# Patient Record
Sex: MALE | Race: WHITE | NOT HISPANIC OR LATINO | Employment: FULL TIME | URBAN - METROPOLITAN AREA
[De-identification: names, ages, dates, MRNs, and addresses within clinical notes are randomized per-mention and may not be internally consistent; named-entity substitution may affect disease eponyms.]

---

## 2019-04-16 ENCOUNTER — OFFICE VISIT (OUTPATIENT)
Dept: URGENT CARE | Facility: CLINIC | Age: 44
End: 2019-04-16
Payer: COMMERCIAL

## 2019-04-16 VITALS
DIASTOLIC BLOOD PRESSURE: 63 MMHG | HEART RATE: 73 BPM | OXYGEN SATURATION: 97 % | SYSTOLIC BLOOD PRESSURE: 134 MMHG | WEIGHT: 193.6 LBS | BODY MASS INDEX: 27.1 KG/M2 | RESPIRATION RATE: 20 BRPM | HEIGHT: 71 IN | TEMPERATURE: 97.4 F

## 2019-04-16 DIAGNOSIS — J01.10 ACUTE FRONTAL SINUSITIS, RECURRENCE NOT SPECIFIED: Primary | ICD-10-CM

## 2019-04-16 PROCEDURE — G0382 LEV 3 HOSP TYPE B ED VISIT: HCPCS | Performed by: FAMILY MEDICINE

## 2019-04-16 RX ORDER — MOMETASONE FUROATE 50 UG/1
1 SPRAY, METERED NASAL 2 TIMES DAILY
Qty: 17 G | Refills: 0 | Status: SHIPPED | OUTPATIENT
Start: 2019-04-16 | End: 2019-05-30

## 2019-04-16 RX ORDER — AZITHROMYCIN 250 MG/1
TABLET, FILM COATED ORAL
Qty: 6 TABLET | Refills: 0 | Status: SHIPPED | OUTPATIENT
Start: 2019-04-16 | End: 2019-04-21

## 2019-05-30 ENCOUNTER — APPOINTMENT (EMERGENCY)
Dept: CT IMAGING | Facility: HOSPITAL | Age: 44
End: 2019-05-30
Payer: COMMERCIAL

## 2019-05-30 ENCOUNTER — HOSPITAL ENCOUNTER (EMERGENCY)
Facility: HOSPITAL | Age: 44
Discharge: HOME | End: 2019-05-30
Attending: EMERGENCY MEDICINE | Admitting: EMERGENCY MEDICINE
Payer: COMMERCIAL

## 2019-05-30 VITALS
HEART RATE: 77 BPM | RESPIRATION RATE: 16 BRPM | DIASTOLIC BLOOD PRESSURE: 64 MMHG | WEIGHT: 194 LBS | TEMPERATURE: 97.3 F | OXYGEN SATURATION: 99 % | BODY MASS INDEX: 27.06 KG/M2 | SYSTOLIC BLOOD PRESSURE: 135 MMHG

## 2019-05-30 DIAGNOSIS — R10.9 ABDOMINAL PAIN: Primary | ICD-10-CM

## 2019-05-30 LAB
ALBUMIN SERPL BCP-MCNC: 3.8 G/DL (ref 3.5–5)
ALP SERPL-CCNC: 61 U/L (ref 46–116)
ALT SERPL W P-5'-P-CCNC: 40 U/L (ref 12–78)
ANION GAP SERPL CALCULATED.3IONS-SCNC: 8 MMOL/L (ref 4–13)
AST SERPL W P-5'-P-CCNC: 29 U/L (ref 5–45)
BASOPHILS # BLD AUTO: 0.03 THOUSANDS/ΜL (ref 0–0.1)
BASOPHILS NFR BLD AUTO: 0 % (ref 0–1)
BILIRUB SERPL-MCNC: 0.38 MG/DL (ref 0.2–1)
BILIRUB UR QL STRIP: NEGATIVE
BUN SERPL-MCNC: 13 MG/DL (ref 5–25)
CALCIUM SERPL-MCNC: 9.7 MG/DL (ref 8.3–10.1)
CHLORIDE SERPL-SCNC: 108 MMOL/L (ref 100–108)
CLARITY UR: ABNORMAL
CO2 SERPL-SCNC: 28 MMOL/L (ref 21–32)
COLOR UR: YELLOW
CREAT SERPL-MCNC: 1.32 MG/DL (ref 0.6–1.3)
EOSINOPHIL # BLD AUTO: 0.12 THOUSAND/ΜL (ref 0–0.61)
EOSINOPHIL NFR BLD AUTO: 2 % (ref 0–6)
ERYTHROCYTE [DISTWIDTH] IN BLOOD BY AUTOMATED COUNT: 12.2 % (ref 11.6–15.1)
GFR SERPL CREATININE-BSD FRML MDRD: 64 ML/MIN/1.73SQ M
GLUCOSE SERPL-MCNC: 79 MG/DL (ref 65–140)
GLUCOSE UR STRIP-MCNC: NEGATIVE MG/DL
HCT VFR BLD AUTO: 44.4 % (ref 36.5–49.3)
HGB BLD-MCNC: 14.6 G/DL (ref 12–17)
HGB UR QL STRIP.AUTO: NEGATIVE
IMM GRANULOCYTES # BLD AUTO: 0.03 THOUSAND/UL (ref 0–0.2)
IMM GRANULOCYTES NFR BLD AUTO: 0 % (ref 0–2)
KETONES UR STRIP-MCNC: NEGATIVE MG/DL
LEUKOCYTE ESTERASE UR QL STRIP: NEGATIVE
LIPASE SERPL-CCNC: 154 U/L (ref 73–393)
LYMPHOCYTES # BLD AUTO: 2.14 THOUSANDS/ΜL (ref 0.6–4.47)
LYMPHOCYTES NFR BLD AUTO: 26 % (ref 14–44)
MCH RBC QN AUTO: 30.7 PG (ref 26.8–34.3)
MCHC RBC AUTO-ENTMCNC: 32.9 G/DL (ref 31.4–37.4)
MCV RBC AUTO: 94 FL (ref 82–98)
MONOCYTES # BLD AUTO: 0.59 THOUSAND/ΜL (ref 0.17–1.22)
MONOCYTES NFR BLD AUTO: 7 % (ref 4–12)
NEUTROPHILS # BLD AUTO: 5.3 THOUSANDS/ΜL (ref 1.85–7.62)
NEUTS SEG NFR BLD AUTO: 65 % (ref 43–75)
NITRITE UR QL STRIP: NEGATIVE
NRBC BLD AUTO-RTO: 0 /100 WBCS
PH UR STRIP.AUTO: 8.5 [PH] (ref 4.5–8)
PLATELET # BLD AUTO: 306 THOUSANDS/UL (ref 149–390)
PMV BLD AUTO: 9.5 FL (ref 8.9–12.7)
POTASSIUM SERPL-SCNC: 3.7 MMOL/L (ref 3.5–5.3)
PROT SERPL-MCNC: 7.3 G/DL (ref 6.4–8.2)
PROT UR STRIP-MCNC: NEGATIVE MG/DL
RBC # BLD AUTO: 4.75 MILLION/UL (ref 3.88–5.62)
SODIUM SERPL-SCNC: 144 MMOL/L (ref 136–145)
SP GR UR STRIP.AUTO: 1.01 (ref 1–1.03)
UROBILINOGEN UR QL STRIP.AUTO: 0.2 E.U./DL
WBC # BLD AUTO: 8.21 THOUSAND/UL (ref 4.31–10.16)

## 2019-05-30 PROCEDURE — 83690 ASSAY OF LIPASE: CPT | Performed by: EMERGENCY MEDICINE

## 2019-05-30 PROCEDURE — 99284 EMERGENCY DEPT VISIT MOD MDM: CPT

## 2019-05-30 PROCEDURE — 81003 URINALYSIS AUTO W/O SCOPE: CPT

## 2019-05-30 PROCEDURE — 85025 COMPLETE CBC W/AUTO DIFF WBC: CPT | Performed by: EMERGENCY MEDICINE

## 2019-05-30 PROCEDURE — 99284 EMERGENCY DEPT VISIT MOD MDM: CPT | Performed by: EMERGENCY MEDICINE

## 2019-05-30 PROCEDURE — 36415 COLL VENOUS BLD VENIPUNCTURE: CPT | Performed by: EMERGENCY MEDICINE

## 2019-05-30 PROCEDURE — 80053 COMPREHEN METABOLIC PANEL: CPT | Performed by: EMERGENCY MEDICINE

## 2019-05-30 PROCEDURE — 74177 CT ABD & PELVIS W/CONTRAST: CPT

## 2019-05-30 PROCEDURE — 96374 THER/PROPH/DIAG INJ IV PUSH: CPT

## 2019-05-30 PROCEDURE — 96361 HYDRATE IV INFUSION ADD-ON: CPT

## 2019-05-30 PROCEDURE — 96375 TX/PRO/DX INJ NEW DRUG ADDON: CPT

## 2019-05-30 RX ORDER — METOCLOPRAMIDE 10 MG/1
10 TABLET ORAL EVERY 6 HOURS
Qty: 10 TABLET | Refills: 0 | Status: SHIPPED | OUTPATIENT
Start: 2019-05-30

## 2019-05-30 RX ORDER — ONDANSETRON 2 MG/ML
4 INJECTION INTRAMUSCULAR; INTRAVENOUS ONCE
Status: COMPLETED | OUTPATIENT
Start: 2019-05-30 | End: 2019-05-30

## 2019-05-30 RX ORDER — DICYCLOMINE HCL 20 MG
20 TABLET ORAL 2 TIMES DAILY
Qty: 10 TABLET | Refills: 0 | Status: SHIPPED | OUTPATIENT
Start: 2019-05-30

## 2019-05-30 RX ORDER — KETOROLAC TROMETHAMINE 30 MG/ML
15 INJECTION, SOLUTION INTRAMUSCULAR; INTRAVENOUS ONCE
Status: COMPLETED | OUTPATIENT
Start: 2019-05-30 | End: 2019-05-30

## 2019-05-30 RX ADMIN — SODIUM CHLORIDE 1000 ML: 0.9 INJECTION, SOLUTION INTRAVENOUS at 15:29

## 2019-05-30 RX ADMIN — IOHEXOL 100 ML: 350 INJECTION, SOLUTION INTRAVENOUS at 16:07

## 2019-05-30 RX ADMIN — ONDANSETRON 4 MG: 2 INJECTION INTRAMUSCULAR; INTRAVENOUS at 15:29

## 2019-05-30 RX ADMIN — KETOROLAC TROMETHAMINE 15 MG: 30 INJECTION, SOLUTION INTRAMUSCULAR; INTRAVENOUS at 15:24

## 2023-12-08 ENCOUNTER — OFFICE VISIT (OUTPATIENT)
Dept: FAMILY MEDICINE CLINIC | Facility: CLINIC | Age: 48
End: 2023-12-08
Payer: COMMERCIAL

## 2023-12-08 ENCOUNTER — TELEPHONE (OUTPATIENT)
Dept: ADMINISTRATIVE | Facility: OTHER | Age: 48
End: 2023-12-08

## 2023-12-08 VITALS
SYSTOLIC BLOOD PRESSURE: 128 MMHG | RESPIRATION RATE: 18 BRPM | BODY MASS INDEX: 28.63 KG/M2 | OXYGEN SATURATION: 98 % | HEART RATE: 74 BPM | TEMPERATURE: 98.1 F | HEIGHT: 70 IN | DIASTOLIC BLOOD PRESSURE: 86 MMHG | WEIGHT: 200 LBS

## 2023-12-08 DIAGNOSIS — E78.2 MIXED HYPERLIPIDEMIA: ICD-10-CM

## 2023-12-08 DIAGNOSIS — R14.0 BLOATING: ICD-10-CM

## 2023-12-08 DIAGNOSIS — M25.551 CHRONIC ARTHRALGIAS OF KNEES AND HIPS: ICD-10-CM

## 2023-12-08 DIAGNOSIS — R11.0 NAUSEA: ICD-10-CM

## 2023-12-08 DIAGNOSIS — R10.9 ABDOMINAL PAIN, UNSPECIFIED ABDOMINAL LOCATION: Primary | ICD-10-CM

## 2023-12-08 DIAGNOSIS — K21.9 GASTROESOPHAGEAL REFLUX DISEASE, UNSPECIFIED WHETHER ESOPHAGITIS PRESENT: ICD-10-CM

## 2023-12-08 DIAGNOSIS — G89.29 CHRONIC ARTHRALGIAS OF KNEES AND HIPS: ICD-10-CM

## 2023-12-08 DIAGNOSIS — M25.561 CHRONIC ARTHRALGIAS OF KNEES AND HIPS: ICD-10-CM

## 2023-12-08 DIAGNOSIS — M25.552 CHRONIC ARTHRALGIAS OF KNEES AND HIPS: ICD-10-CM

## 2023-12-08 DIAGNOSIS — M25.562 CHRONIC ARTHRALGIAS OF KNEES AND HIPS: ICD-10-CM

## 2023-12-08 DIAGNOSIS — R53.83 OTHER FATIGUE: ICD-10-CM

## 2023-12-08 PROCEDURE — 99203 OFFICE O/P NEW LOW 30 MIN: CPT

## 2023-12-08 RX ORDER — OMEPRAZOLE 20 MG/1
20 CAPSULE, DELAYED RELEASE ORAL DAILY
COMMUNITY

## 2023-12-08 NOTE — TELEPHONE ENCOUNTER
----- Message from Mahi Cifuentes sent at 12/8/2023  9:41 AM EST -----  Regarding: Care Gap update  12/08/23 9:42 AM    Hello, our patient King Brooks has had CRC: Colonoscopy completed/performed. Please assist in updating the patient chart by pulling the Care Everywhere (CE) document with [facility]. The date of service is 04/13/2023.      Thank you,  Ivy Grayson [facility]

## 2023-12-08 NOTE — PROGRESS NOTES
Name: Brenden Duque      : 1975      MRN: [id number]  Encounter Provider: JIM Monteiro  Encounter Date: 2023   Encounter department: [address] [place] [address]. Abdominal pain, unspecified abdominal location  Assessment & Plan:  Patient reports generalized abdominal discomfort, indigestion, nausea, and bloating for approx 1 year. Seen by GI-- per patient EGD and colonoscopy was unremarkable. Patient has tried to taper off of omeprazole but gets severe rebound indigestion. Has been taking EOD. Has tried pepcid in the past--states that although it may be unrelated, he started with an eye problem at the same time as starting pepcid so he discontinued. Declines ref to [facility] GI. Ordered CBC, CMP, thyroid panel, celiac panel, CRP, and lipase. Last CT in 2019. Repeat if symptoms persist? Return for follow up 2-3 weeks    Orders:  -     Celiac Disease Comprehensive Panel; Future  -     Celiac Disease Comprehensive Panel  -     Lipase; Future  -     Lipase    2. Other fatigue  Assessment & Plan:  Fatigue mostly associated with abd discomfort. Checking CRP and thyroid panel. Consider test in the future. Orders:  -     C-reactive protein; Future  -     CBC and differential; Future  -     Comprehensive metabolic panel; Future  -     T4, free; Future  -     TSH, 3rd generation; Future  -     C-reactive protein  -     CBC and differential  -     Comprehensive metabolic panel  -     T4, free  -     TSH, 3rd generation    3. Mixed hyperlipidemia  -     Lipid Panel with Direct LDL reflex; Future  -     Lipid Panel with Direct LDL reflex    4. Chronic arthralgias of knees and hips  -     C-reactive protein; Future  -     C-reactive protein    5. Bloating  Assessment & Plan:  See abd pain note. Orders:  -     Celiac Disease Comprehensive Panel; Future  -     Celiac Disease Comprehensive Panel  -     Lipase; Future  -     Lipase    6.  Nausea  Assessment & Plan:  Discontinued reglan. Encouraged food diary. Orders:  -     Celiac Disease Comprehensive Panel; Future  -     Celiac Disease Comprehensive Panel  -     Lipase; Future  -     Lipase    7. Gastroesophageal reflux disease, unspecified whether esophagitis present  Assessment & Plan:  Takes omeprazole EOD. BMI Counseling: Body mass index is 29.11 kg/m². The BMI is above normal. Nutrition recommendations include decreasing portion sizes. Exercise recommendations include exercising 3-5 times per week. Rationale for BMI follow-up plan is due to patient being overweight or obese. Depression Screening and Follow-up Plan: Patient was screened for depression during today's encounter. They screened negative with a PHQ-2 score of 1. Subjective      Heartburn  He complains of abdominal pain, early satiety, heartburn and nausea. He reports no belching, no chest pain, no choking, no coughing, no dysphagia, no globus sensation, no hoarse voice, no sore throat, no stridor, no tooth decay, no water brash or no wheezing. This is a chronic problem. The current episode started more than 1 year ago. The problem occurs frequently. The problem has been unchanged. The heartburn is located in the LUQ and substernum. The heartburn is of moderate intensity. Exacerbated by: unknown. Pertinent negatives include no anemia, fatigue, melena, muscle weakness, orthopnea or weight loss. Risk factors include caffeine use and hiatal hernia (decreases caffeine to 1 cup coffee/daily). He has tried head elevation, a PPI, a histamine-2 antagonist, medication cessation and a diet change for the symptoms. The treatment provided mild relief. Past procedures include an EGD. Review of Systems   Constitutional:  Negative for activity change, fatigue and weight loss. HENT:  Negative for congestion, ear pain, hoarse voice, rhinorrhea and sore throat. Eyes:  Negative for pain.    Respiratory:  Negative for cough, choking, shortness of breath and wheezing. Cardiovascular:  Negative for chest pain and leg swelling. Gastrointestinal:  Positive for abdominal distention, abdominal pain, heartburn and nausea. Negative for anal bleeding, blood in stool, constipation, diarrhea, dysphagia, melena, rectal pain and vomiting. Musculoskeletal:  Negative for arthralgias, myalgias and muscle weakness. Skin:  Negative for rash. Neurological:  Negative for dizziness, weakness and numbness. All other systems reviewed and are negative. Current Outpatient Medications on File Prior to Visit   Medication Sig    Cetirizine HCl (ZYRTEC ALLERGY PO) Take by mouth    omeprazole (PriLOSEC) 20 mg delayed release capsule Take 20 mg by mouth daily    [DISCONTINUED] dicyclomine (BENTYL) 20 mg tablet Take 1 tablet (20 mg total) by mouth 2 (two) times a day (Patient not taking: Reported on 12/8/2023)    [DISCONTINUED] metoclopramide (REGLAN) 10 mg tablet Take 1 tablet (10 mg total) by mouth every 6 (six) hours (Patient not taking: Reported on 12/8/2023)       Objective     /86 (BP Location: Right arm, Patient Position: Sitting, Cuff Size: Adult)   Pulse 74   Temp 98.1 °F (36.7 °C) (Tympanic)   Resp 18   Ht 5' 9.5" (1.765 m)   Wt 90.7 kg (200 lb)   SpO2 98%   BMI 29.11 kg/m²     Physical Exam  Vitals and nursing note reviewed. Constitutional:       Appearance: Normal appearance. HENT:      Head: Normocephalic. Cardiovascular:      Rate and Rhythm: Normal rate and regular rhythm. Heart sounds: Normal heart sounds. Pulmonary:      Effort: Pulmonary effort is normal.      Breath sounds: Normal breath sounds. Abdominal:      General: Abdomen is flat. Bowel sounds are normal. There is no distension. Palpations: Abdomen is soft. Tenderness: There is no abdominal tenderness. There is no guarding or rebound. Musculoskeletal:      Cervical back: Neck supple. Skin:     General: Skin is warm and dry.    Neurological: General: No focal deficit present. Mental Status: He is alert and oriented to person, place, and time. Mental status is at baseline.    Psychiatric:         Behavior: Behavior normal.       Arie Kawasaki, CRNP

## 2023-12-08 NOTE — ASSESSMENT & PLAN NOTE
Patient reports generalized abdominal discomfort, indigestion, nausea, and bloating for approx 1 year. Seen by GI-- per patient EGD and colonoscopy was unremarkable. Patient has tried to taper off of omeprazole but gets severe rebound indigestion. Has been taking EOD. Has tried pepcid in the past--states that although it may be unrelated, he started with an eye problem at the same time as starting pepcid so he discontinued. Declines ref to [facility] GI. Ordered CBC, CMP, thyroid panel, celiac panel, CRP, and lipase. Last CT in 2019.  Repeat if symptoms persist? Return for follow up 2-3 weeks

## 2023-12-08 NOTE — TELEPHONE ENCOUNTER
Upon review of the In Basket request we were able to locate, review, and update the patient chart as requested for CRC: Colonoscopy. Any additional questions or concerns should be emailed to the Practice Liaisons via the appropriate education email address, please do not reply via In Basket.     Thank you  Fatuma Nassar MA

## 2023-12-08 NOTE — ASSESSMENT & PLAN NOTE
Fatigue mostly associated with abd discomfort. Checking CRP and thyroid panel. Consider test in the future.

## 2023-12-23 LAB
ALBUMIN SERPL-MCNC: 4.2 G/DL (ref 3.6–5.1)
ALBUMIN/GLOB SERPL: 2.1 (CALC) (ref 1–2.5)
ALP SERPL-CCNC: 73 U/L (ref 35–144)
ALT SERPL-CCNC: 28 U/L (ref 9–46)
AST SERPL-CCNC: 22 U/L (ref 10–35)
BASOPHILS # BLD AUTO: 42 CELLS/UL (ref 0–200)
BASOPHILS NFR BLD AUTO: 0.7 %
BILIRUB SERPL-MCNC: 0.4 MG/DL (ref 0.2–1.2)
BUN SERPL-MCNC: 22 MG/DL (ref 7–25)
BUN/CREAT SERPL: NORMAL (CALC) (ref 6–22)
CALCIUM SERPL-MCNC: 9.3 MG/DL (ref 8.6–10.3)
CHLORIDE SERPL-SCNC: 107 MMOL/L (ref 98–110)
CHOLEST SERPL-MCNC: 226 MG/DL
CHOLEST/HDLC SERPL: 5.1 (CALC)
CO2 SERPL-SCNC: 28 MMOL/L (ref 20–32)
CREAT SERPL-MCNC: 1.11 MG/DL (ref 0.7–1.3)
CRP SERPL-MCNC: 0.3 MG/L
EOSINOPHIL # BLD AUTO: 102 CELLS/UL (ref 15–500)
EOSINOPHIL NFR BLD AUTO: 1.7 %
ERYTHROCYTE [DISTWIDTH] IN BLOOD BY AUTOMATED COUNT: 11.8 % (ref 11–15)
GFR/BSA.PRED SERPLBLD CYS-BASED-ARV: 81 ML/MIN/1.73M2
GLOBULIN SER CALC-MCNC: 2 G/DL (CALC) (ref 1.9–3.7)
GLUCOSE SERPL-MCNC: 98 MG/DL (ref 65–99)
HCT VFR BLD AUTO: 43.7 % (ref 38.5–50)
HDLC SERPL-MCNC: 44 MG/DL
HGB BLD-MCNC: 14.9 G/DL (ref 13.2–17.1)
IGA SERPL-MCNC: 118 MG/DL (ref 47–310)
LDLC SERPL CALC-MCNC: 157 MG/DL (CALC)
LIPASE SERPL-CCNC: 32 U/L (ref 7–60)
LYMPHOCYTES # BLD AUTO: 2076 CELLS/UL (ref 850–3900)
LYMPHOCYTES NFR BLD AUTO: 34.6 %
MCH RBC QN AUTO: 31.1 PG (ref 27–33)
MCHC RBC AUTO-ENTMCNC: 34.1 G/DL (ref 32–36)
MCV RBC AUTO: 91.2 FL (ref 80–100)
MONOCYTES # BLD AUTO: 426 CELLS/UL (ref 200–950)
MONOCYTES NFR BLD AUTO: 7.1 %
NEUTROPHILS # BLD AUTO: 3354 CELLS/UL (ref 1500–7800)
NEUTROPHILS NFR BLD AUTO: 55.9 %
NONHDLC SERPL-MCNC: 182 MG/DL (CALC)
PLATELET # BLD AUTO: 316 THOUSAND/UL (ref 140–400)
PMV BLD REES-ECKER: 10.2 FL (ref 7.5–12.5)
POTASSIUM SERPL-SCNC: 4.2 MMOL/L (ref 3.5–5.3)
PROT SERPL-MCNC: 6.2 G/DL (ref 6.1–8.1)
RBC # BLD AUTO: 4.79 MILLION/UL (ref 4.2–5.8)
SODIUM SERPL-SCNC: 140 MMOL/L (ref 135–146)
T4 FREE SERPL-MCNC: 1 NG/DL (ref 0.8–1.8)
TRIGL SERPL-MCNC: 130 MG/DL
TSH SERPL-ACNC: 0.85 MIU/L (ref 0.4–4.5)
TTG IGA SER-ACNC: <1 U/ML
WBC # BLD AUTO: 6 THOUSAND/UL (ref 3.8–10.8)

## 2023-12-26 ENCOUNTER — TELEPHONE (OUTPATIENT)
Dept: FAMILY MEDICINE CLINIC | Facility: CLINIC | Age: 48
End: 2023-12-26

## 2023-12-26 NOTE — TELEPHONE ENCOUNTER
Pt aware       ----- Message from JIM Kaye sent at 12/24/2023  3:14 PM EST -----  Please notify patient:  Labs overall look good other than elevated cholesterol and LDL. Manage with healthy diet (fruits, veggies, lean protein, whole grains, legumes) and continue with exercise regimen. I believe he told me he exercises regularly.   ----- Message -----  From: Albert Cheung Lab Results In  Sent: 12/23/2023  12:45 AM EST  To: JIM Kaye

## 2024-01-03 ENCOUNTER — HOSPITAL ENCOUNTER (EMERGENCY)
Facility: HOSPITAL | Age: 49
Discharge: HOME | End: 2024-01-03
Attending: EMERGENCY MEDICINE
Payer: COMMERCIAL

## 2024-01-03 VITALS
SYSTOLIC BLOOD PRESSURE: 145 MMHG | BODY MASS INDEX: 29.88 KG/M2 | TEMPERATURE: 97.9 F | RESPIRATION RATE: 19 BRPM | HEART RATE: 86 BPM | OXYGEN SATURATION: 95 % | WEIGHT: 205.25 LBS | DIASTOLIC BLOOD PRESSURE: 78 MMHG

## 2024-01-03 DIAGNOSIS — H53.8 BLURRED VISION, RIGHT EYE: ICD-10-CM

## 2024-01-03 DIAGNOSIS — R51.9 HEADACHE: Primary | ICD-10-CM

## 2024-01-03 PROCEDURE — 99283 EMERGENCY DEPT VISIT LOW MDM: CPT

## 2024-01-03 PROCEDURE — 99283 EMERGENCY DEPT VISIT LOW MDM: CPT | Performed by: EMERGENCY MEDICINE

## 2024-01-04 ENCOUNTER — OFFICE VISIT (OUTPATIENT)
Dept: FAMILY MEDICINE CLINIC | Facility: CLINIC | Age: 49
End: 2024-01-04
Payer: COMMERCIAL

## 2024-01-04 VITALS
SYSTOLIC BLOOD PRESSURE: 138 MMHG | TEMPERATURE: 97.5 F | OXYGEN SATURATION: 98 % | DIASTOLIC BLOOD PRESSURE: 90 MMHG | WEIGHT: 200 LBS | BODY MASS INDEX: 29.62 KG/M2 | HEART RATE: 80 BPM | HEIGHT: 69 IN | RESPIRATION RATE: 16 BRPM

## 2024-01-04 DIAGNOSIS — E78.49 OTHER HYPERLIPIDEMIA: ICD-10-CM

## 2024-01-04 DIAGNOSIS — Z00.00 ROUTINE GENERAL MEDICAL EXAMINATION AT A HEALTH CARE FACILITY: ICD-10-CM

## 2024-01-04 DIAGNOSIS — R10.84 GENERALIZED ABDOMINAL PAIN: ICD-10-CM

## 2024-01-04 DIAGNOSIS — K21.9 GASTROESOPHAGEAL REFLUX DISEASE WITHOUT ESOPHAGITIS: ICD-10-CM

## 2024-01-04 DIAGNOSIS — G44.89 OTHER HEADACHE SYNDROME: ICD-10-CM

## 2024-01-04 DIAGNOSIS — R14.0 BLOATING: ICD-10-CM

## 2024-01-04 DIAGNOSIS — G44.219 EPISODIC TENSION-TYPE HEADACHE, NOT INTRACTABLE: Primary | ICD-10-CM

## 2024-01-04 PROBLEM — R53.83 OTHER FATIGUE: Status: RESOLVED | Noted: 2023-12-08 | Resolved: 2024-01-04

## 2024-01-04 PROCEDURE — 99204 OFFICE O/P NEW MOD 45 MIN: CPT | Performed by: FAMILY MEDICINE

## 2024-01-04 PROCEDURE — 99386 PREV VISIT NEW AGE 40-64: CPT | Performed by: FAMILY MEDICINE

## 2024-01-04 RX ORDER — BUTALBITAL, ACETAMINOPHEN AND CAFFEINE 300; 40; 50 MG/1; MG/1; MG/1
1 CAPSULE ORAL DAILY PRN
Qty: 30 CAPSULE | Refills: 1 | Status: SHIPPED | OUTPATIENT
Start: 2024-01-04

## 2024-01-04 NOTE — ED PROVIDER NOTES
History  Chief Complaint   Patient presents with    Headache     Pt reports blurred vision to right eye and headache for the last 9 months. States that the headache is not new but has been worsening over time, states new onset of dizziness for the last few days . Pt states his PCP just retired and came to be evaluated in ED instead. Denies numbness/tingling/ chest pain and sob.        History provided by:  Patient   used: No    Headache  Pain location:  Occipital  Radiates to: front of head.  Severity currently:  Unable to specify  Severity at highest:  Unable to specify  Onset quality:  Unable to specify  Timing:  Unable to specify  Progression:  Waxing and waning  Chronicity:  Recurrent  Similar to prior headaches: yes    Context: bright light    Relieved by:  Nothing  Worsened by:  Nothing  Ineffective treatments:  None tried  Associated symptoms: blurred vision and photophobia    Associated symptoms: no abdominal pain, no back pain, no cough, no diarrhea, no dizziness, no eye pain, no fever, no nausea, no neck pain, no neck stiffness, no sore throat and no vomiting    Risk factors comment:  None      Prior to Admission Medications   Prescriptions Last Dose Informant Patient Reported? Taking?   Cetirizine HCl (ZYRTEC ALLERGY PO)   Yes No   Sig: Take by mouth   omeprazole (PriLOSEC) 20 mg delayed release capsule   Yes No   Sig: Take 20 mg by mouth daily      Facility-Administered Medications: None       History reviewed. No pertinent past medical history.    History reviewed. No pertinent surgical history.    History reviewed. No pertinent family history.  I have reviewed and agree with the history as documented.    E-Cigarette/Vaping    E-Cigarette Use Never User      E-Cigarette/Vaping Substances     Social History     Tobacco Use    Smoking status: Former     Types: Cigarettes    Smokeless tobacco: Never   Vaping Use    Vaping status: Never Used   Substance Use Topics    Alcohol use: Yes     Drug use: Not Currently       Review of Systems   Constitutional:  Negative for chills and fever.   HENT:  Negative for facial swelling, sore throat and trouble swallowing.    Eyes:  Positive for blurred vision and photophobia. Negative for pain and visual disturbance.   Respiratory:  Negative for cough and shortness of breath.    Cardiovascular:  Negative for chest pain and leg swelling.   Gastrointestinal:  Negative for abdominal pain, diarrhea, nausea and vomiting.   Genitourinary:  Negative for dysuria and flank pain.   Musculoskeletal:  Negative for back pain, neck pain and neck stiffness.   Skin:  Negative for pallor and rash.   Allergic/Immunologic: Negative for environmental allergies and immunocompromised state.   Neurological:  Positive for headaches. Negative for dizziness.   Hematological:  Negative for adenopathy. Does not bruise/bleed easily.   Psychiatric/Behavioral:  Negative for agitation and behavioral problems.    All other systems reviewed and are negative.      Physical Exam  Physical Exam  Vitals and nursing note reviewed.   Constitutional:       General: He is not in acute distress.     Appearance: He is well-developed.   HENT:      Head: Normocephalic and atraumatic.   Eyes:      Extraocular Movements: Extraocular movements intact.      Pupils: Pupils are equal, round, and reactive to light.   Cardiovascular:      Rate and Rhythm: Normal rate and regular rhythm.   Pulmonary:      Effort: Pulmonary effort is normal. No respiratory distress.      Breath sounds: Normal breath sounds.   Abdominal:      Palpations: Abdomen is soft.      Tenderness: There is no abdominal tenderness. There is no guarding or rebound.   Musculoskeletal:         General: Normal range of motion.      Cervical back: Normal range of motion and neck supple.   Skin:     General: Skin is warm and dry.   Neurological:      General: No focal deficit present.      Mental Status: He is alert and oriented to person, place, and  time.      Cranial Nerves: No cranial nerve deficit.      Motor: No weakness.      Coordination: Coordination normal.   Psychiatric:         Mood and Affect: Mood normal.         Behavior: Behavior normal.         Vital Signs  ED Triage Vitals   Temperature Pulse Respirations Blood Pressure SpO2   01/03/24 1931 01/03/24 1934 01/03/24 1934 01/03/24 1934 01/03/24 1934   97.9 °F (36.6 °C) 86 19 145/78 95 %      Temp Source Heart Rate Source Patient Position - Orthostatic VS BP Location FiO2 (%)   01/03/24 1931 01/03/24 1934 01/03/24 1934 01/03/24 1934 --   Oral Monitor Sitting Right arm       Pain Score       --                  Vitals:    01/03/24 1934   BP: 145/78   Pulse: 86   Patient Position - Orthostatic VS: Sitting         Visual Acuity      ED Medications  Medications - No data to display    Diagnostic Studies  Results Reviewed       None                   No orders to display              Procedures  Procedures         ED Course                  Stroke Assessment       Row Name 01/03/24 2056             NIH Stroke Scale    Interval Baseline      Level of Consciousness (1a.) 0      LOC Questions (1b.) 0      LOC Commands (1c.) 0      Best Gaze (2.) 0      Visual (3.) 0      Facial Palsy (4.) 0      Motor Arm, Left (5a.) 0      Motor Arm, Right (5b.) 0      Motor Leg, Left (6a.) 0      Motor Leg, Right (6b.) 0      Limb Ataxia (7.) 0      Sensory (8.) 0      Best Language (9.) 0      Dysarthria (10.) 0      Extinction and Inattention (11.) (Formerly Neglect) 0      Total 0                                              Medical Decision Making  Patient is a 48-year-old male, comes in with complaints of headaches, blurred vision in the right eye, blurred vision going on for about 9 months, patient has had ophthalmology evaluation with no abnormality detected, has had MRI of the brain which was normal in 2021; started with a headache 3 days back, headache is described in the back of the head to the front of the head,  associate with photophobia, no loss of vision, no nausea, vomiting, neck pain, fever, congestion, cough.  On exam, patient is conscious, alert, vital signs stable, no acute distress, well-appearing, pupils equal round active to light, no nystagmus, external movements intact, bedside visual acuity normal, no double vision, nonfocal neuroexam.  Differential diagnosis: Migraine, nonspecific headache, intact neuroexam, discussed about migraine management, patient opts for p.o. management at home, advised Tylenol, Advil, follow-up with neurology.  Due to persisting visual complaints and headaches, will put neurology referral.             Disposition  Final diagnoses:   Headache   Blurred vision, right eye     Time reflects when diagnosis was documented in both MDM as applicable and the Disposition within this note       Time User Action Codes Description Comment    1/3/2024  8:53 PM Jefferson Carrillo Add [R51.9] Headache     1/3/2024  8:55 PM Jefferson Carrillo Add [H53.8] Blurred vision, right eye           ED Disposition       ED Disposition   Discharge    Condition   Stable    Date/Time   Wed Tez 3, 2024  8:54 PM    Comment   Da Gómez discharge to home/self care.                   Follow-up Information       Follow up With Specialties Details Why Contact Info Additional Information    JIM Kaye Nurse Practitioner, Family Medicine Schedule an appointment as soon as possible for a visit   [address]  [facility] [address] [phone]       [facility] Neurology Schedule an appointment as soon as possible for a visit   [address]  [address] [facility] [address] [phone] [facility], [address] [place], [place], [address] [phone]            Patient's Medications   Discharge Prescriptions    No medications on file           PDMP Review       None            ED  Provider  Electronically Signed by             Jefferson Carrillo MD  01/03/24 2096

## 2024-01-04 NOTE — PATIENT INSTRUCTIONS
I condition I believe you have is vitreous detachment and that is making an image of your optic nerve as it enters into your eyeball and this is what you are seeing.  Unfortunately there is not a whole lot you can do about that except try to ignore it and hope that that helps to see around  Please follow-up with the eye doc as you have already planned    Please try to keep your sodium level and your aerobic activities up.  Aerobically we would like to see you exercise about 3 hours weekly  To help to decrease your blood pressure    For your headache hopefully neurologist power and this will start going away  In the meantime Fioricet has been called for you at the 1st sign of the headache to begin and wait 2 hours and if it is not helping take a 2nd and try not to take them more than once a week because they can be habit-forming    Consider getting a CT coronary score for 99 bucks and we will call you when we get it back    Otherwise recheck in 6 months with fasting cholesterol ordered 2 weeks prior  See you sooner if needed

## 2024-01-04 NOTE — PROGRESS NOTES
ASSESSMENT/PLAN:    Headache  Sounds like a tension headache because of not knowing what is going on with the eye spot as well as the change in his vision from being able to read small print to now not being able to see it without having a reader on  Has an appointment in the near future with the specialist  Will try Fioricet as needed not more than once a week for the tension headache  Hopefully knowing that his headache is not terrible nothing to worry about will help him    Eye disturbance  This sounds like a vitreous detachment  A retinal guys should be able to see this in a regular ophthalmologist should be able to see this  Hopefully he will get this done in the near future to further give him a reason to relax    GERD  Some antral gastritis on his EGD in April 2023  For this he is on omeprazole and it seems to help  Occasionally gets nausea especially in the morning  Also with abdominal pain and bloating  Recently had celiac studies done and these were negative, lipase also negative as well as CRP    Hyperlipidemia  Cholesterol is 226 with a LDL of 157  This does put him at an intermediate risk for coronary artery disease    Elevated blood pressure  Patient will try to cut down on sodium by adding it or things that naturally have it like canned vegetables canned soups or any fast food which he already avoids  We will check him back in 6 months to see how he is doing  By Omron blood pressure cuff and check his blood pressures once a week    Recheck in 6 months  Check lipids prior  Hopefully CT coronary score is done            Depression Screening and Follow-up Plan: Patient was screened for depression during today's encounter. They screened negative with a PHQ-2 score of 1.           Health Maintenance   Topic Date Due    Annual Physical  Never done    DTaP,Tdap,and Td Vaccines (1 - Tdap) Never done    COVID-19 Vaccine (3 - 2023-24 season) 09/01/2023    Influenza Vaccine (1) 06/30/2024 (Originally 9/1/2023)     HIV Screening  01/04/2026 (Originally 1/23/1988)    Hepatitis C Screening  02/04/2026 (Originally 1975)    Depression Screening  01/04/2025    Colorectal Cancer Screening  04/14/2028    Pneumococcal Vaccine: Pediatrics (0 to 5 Years) and At-Risk Patients (6 to 64 Years)  Aged Out    HIB Vaccine  Aged Out    IPV Vaccine  Aged Out    Hepatitis A Vaccine  Aged Out    Meningococcal ACWY Vaccine  Aged Out    HPV Vaccine  Aged Out         Problem List as of 1/4/2024 Reviewed: 12/8/2023  9:34 AM by JIM Kaye      Abdominal pain    Last Assessment & Plan 12/8/2023 Office Visit Edited 12/8/2023 10:51 AM by JIM Kaye     Patient reports generalized abdominal discomfort, indigestion, nausea, and bloating for approx 1 year. Seen by GI-- per patient EGD and colonoscopy was unremarkable. Patient has tried to taper off of omeprazole but gets severe rebound indigestion. Has been taking EOD. Has tried pepcid in the past--states that although it may be unrelated, he started with an eye problem at the same time as starting pepcid so he discontinued. Declines ref to [facility]. Ordered CBC, CMP, thyroid panel, celiac panel, CRP, and lipase. Last CT in 2019. Repeat if symptoms persist? Return for follow up 2-3 weeks         Bloating    Last Assessment & Plan 12/8/2023 Office Visit Written 12/8/2023 10:51 AM by JIM Kaye     See abd pain note.          Dizziness    Gastroesophageal reflux disease    Last Assessment & Plan 12/8/2023 Office Visit Written 12/8/2023 10:52 AM by JIM Kaye     Takes omeprazole EOD.          Nausea    Last Assessment & Plan 12/8/2023 Office Visit Edited 12/8/2023 10:52 AM by JIM Kaye     Discontinued reglan. Encouraged food diary.          Other fatigue    Last Assessment & Plan 12/8/2023 Office Visit Written 12/8/2023 10:30 AM by JIM Kaye     Fatigue mostly associated with abd discomfort.  Checking CRP and thyroid panel. Consider test in the future.          Other headache syndrome    Other hyperlipidemia         Subjective:   Chief Complaint   Patient presents with    Establish Care     Previous PCP retired    Blurred Vision     Pt has seen eye doctor and been to ED  Started 9 months ago  Now with headaches since Sunday     Patient is here as a new patient and was in the emergency unit yesterday for a headache that came from his occiput up to his frontal area and behind his eyes.    He also has a blurry spot in his right eye and has had this for at least the last 9 months.    It is more intense when he looks at the screen of the berta but it is always there never goes away  Has had several prescriptions but none of them really change as it is not his eye refraction  He has had a MRI of his head in 2021 as well as 2016 and these are basically normal  He had blood work done in September that is basically normal  He works as a manager around the plant locally  He has kids 28, 25 and 17 and a 17-year-old still lives with the family    He does exercise by lifting 5 days a week and running 2-3 times a week 3 miles one dose    Also issues GI that he has seen GI for  In April 2023 had a EGD and a colonoscopy that are basically unremarkable        patient ID: Da Gómez is a 48 y.o. male.    Patient's past medical history, surgical history, family history, social history, and Tobacco history reviewed with patient.     MED LIST WAS REVIEWED AND UPDATED    ROS  As per HPI  Rest of 12 point review of systems negative     Objective:      VITALS:  Wt Readings from Last 3 Encounters:   01/04/24 90.7 kg (200 lb)   01/03/24 93.1 kg (205 lb 4 oz)   12/08/23 90.7 kg (200 lb)     BP Readings from Last 3 Encounters:   01/04/24 138/90   01/03/24 145/78   12/08/23 128/86     Pulse Readings from Last 3 Encounters:   01/04/24 80   01/03/24 86   12/08/23 74     Body mass index is 29.32 kg/m².    Laboratory Results:   All  pertinent labs and studies were reviewed with patient during this office visit with highlights of the results contained in this note in the ASSESSMENT AND PLAN section       Physical Exam    Gen.  No acute distress well-appearing well-nourished appears stated age    Mental status  Good judgment and insight oriented to time person and place, recent and remote memory intact mood and affect normal cooperative and patient is reasonable    HEENT  PERRLA 3 mm, EOMI without nystagmus, normocephalic atraumatic without facial weakness    Neck   supple no masses trachea midline positive click normal carotid upstrokes with no bruits    Cor  Regular rhythm without ectopy or murmur, no S3-S4, normal palpation that is no heave lift or thrill    Vascular  No edema, good pedal pulses    Lungs  CTA bilaterally in no respiratory distress no wheezes rhonchi or rales, normal to palpation no tactile fremitus    Abdomen  Soft, no palpable masses, no hepatosplenomegaly, normal bowel sounds, nontender    Lymphatics  No palpable nodes in the neck, supraclavicular area, axilla, or groin    Musculoskeletal  No clubbing cyanosis or edema muscle tone normal    Skin  no rashes or abnormal appearing lesions    Neuro  Normal ambulation, cranial nerves 2-12 grossly intact, higher functioning with reasoning intact.

## 2024-01-04 NOTE — PROGRESS NOTES
SUBJECTIVE:--------------------------------------------------------------------------------------------------  Patient is here for a well exam         Da Gómez is a 48 y.o.  male and is here for routine health maintenance.     Health Maintenance   Topic Date Due    Annual Physical  Never done    DTaP,Tdap,and Td Vaccines (1 - Tdap) Never done    COVID-19 Vaccine (3 - 2023-24 season) 09/01/2023    Influenza Vaccine (1) 06/30/2024 (Originally 9/1/2023)    HIV Screening  01/04/2026 (Originally 1/23/1988)    Hepatitis C Screening  02/04/2026 (Originally 1975)    Depression Screening  01/04/2025    Colorectal Cancer Screening  04/14/2028    Pneumococcal Vaccine: Pediatrics (0 to 5 Years) and At-Risk Patients (6 to 64 Years)  Aged Out    HIB Vaccine  Aged Out    IPV Vaccine  Aged Out    Hepatitis A Vaccine  Aged Out    Meningococcal ACWY Vaccine  Aged Out    HPV Vaccine  Aged Out     Immunization History   Administered Date(s) Administered    COVID-19 MODERNA VACC 0.5 ML IM 04/10/2021, 05/08/2021    INFLUENZA 10/07/2018    Influenza, injectable, quadrivalent, preservative free 0.5 mL 10/07/2018       Diet and Physical Activity  Diet: well balanced diet  Weight concerns: Patient is overweight (BMI 25.0-29.9)  Exercise: frequently       General Health  Hearing:  Normal reported by patient  Vision:  Sees Ophthalmology/Optometry yearly  Dental:  is due    Smoker no       ASSESSMENT/PLAN:---------------------------------------------------------------------------------------    Patient's physical is up-to-date  Immunizations are up-to-date  Cancer screenings are up-to-date      Patient instructed on exercise  Patient instructed on healthy choices for diet      NEXT PHYSICAL 1 YEAR        The following portions of the patient's history were reviewed and updated as appropriate: allergies, current medications, past family history, past medical history, past social history, past surgical history and problem  "list.      OBJECTIVE:--------------------------------------------------------------------------------------------------  /90   Pulse 80   Temp 97.5 °F (36.4 °C) (Temporal)   Resp 16   Ht 5' 9.25\" (1.759 m)   Wt 90.7 kg (200 lb)   SpO2 98%   BMI 29.32 kg/m²   Wt Readings from Last 3 Encounters:   01/04/24 90.7 kg (200 lb)   01/03/24 93.1 kg (205 lb 4 oz)   12/08/23 90.7 kg (200 lb)     BP Readings from Last 3 Encounters:   01/04/24 138/90   01/03/24 145/78   12/08/23 128/86     Pulse Readings from Last 3 Encounters:   01/04/24 80   01/03/24 86   12/08/23 74     Body mass index is 29.32 kg/m².  Health Maintenance   Topic Date Due    Annual Physical  Never done    DTaP,Tdap,and Td Vaccines (1 - Tdap) Never done    COVID-19 Vaccine (3 - 2023-24 season) 09/01/2023    Influenza Vaccine (1) 06/30/2024 (Originally 9/1/2023)    HIV Screening  01/04/2026 (Originally 1/23/1988)    Hepatitis C Screening  02/04/2026 (Originally 1975)    Depression Screening  01/04/2025    Colorectal Cancer Screening  04/14/2028    Pneumococcal Vaccine: Pediatrics (0 to 5 Years) and At-Risk Patients (6 to 64 Years)  Aged Out    HIB Vaccine  Aged Out    IPV Vaccine  Aged Out    Hepatitis A Vaccine  Aged Out    Meningococcal ACWY Vaccine  Aged Out    HPV Vaccine  Aged Out         ROS:   12 point review of systems negative            PHYSICAL EXAM:    Gen.  No acute distress well-appearing well-nourished appears stated age    Mental status  Good judgment and insight oriented to time person and place, recent and remote memory intact mood and affect normal cooperative and patient is reasonable    HEENT  PERRLA 3 mm, EOMI without nystagmus, TMs clear, turbinates open pink no exudate, pharynx benign, tongue midline    Neck   supple no masses trachea midline positive click normal carotid upstrokes with no bruits    Cor  Regular rhythm without ectopy or murmur, no S3-S4, normal palpation that is no heave lift or thrill    Vascular  No " edema, good pedal pulses    Lungs  CTA bilaterally in no respiratory distress no wheezes rhonchi or rales, normal to palpation no tactile fremitus    Abdomen  Soft, no palpable masses, no hepatosplenomegaly, normal bowel sounds, nontender    Lymphatics  No palpable nodes in the neck, supraclavicular area, axilla, or groin     Musculoskeletal  No clubbing cyanosis or edema muscle tone normal    Skin  no rashes or abnormal appearing lesions    Neuro  Normal ambulation, cranial nerves 2-12 grossly intact, higher functioning with reasoning intact.

## 2024-01-09 ENCOUNTER — TELEPHONE (OUTPATIENT)
Dept: NEUROLOGY | Facility: CLINIC | Age: 49
End: 2024-01-09

## 2024-01-21 ENCOUNTER — HOSPITAL ENCOUNTER (OUTPATIENT)
Dept: CT IMAGING | Facility: HOSPITAL | Age: 49
Discharge: HOME | End: 2024-01-21
Payer: COMMERCIAL

## 2024-01-21 DIAGNOSIS — E78.49 OTHER HYPERLIPIDEMIA: ICD-10-CM

## 2024-01-21 PROCEDURE — 75571 CT HRT W/O DYE W/CA TEST: CPT

## 2024-01-21 PROCEDURE — G1004 CDSM NDSC: HCPCS

## 2024-01-26 ENCOUNTER — TELEPHONE (OUTPATIENT)
Dept: FAMILY MEDICINE CLINIC | Facility: CLINIC | Age: 49
End: 2024-01-26

## 2024-01-26 NOTE — RESULT ENCOUNTER NOTE
Call patient regarding his coronary calcium score  It is excellent at 6  I would like him to keep working on getting his cholesterol down through diet but do not find any to treat it with medication at this time

## 2024-01-26 NOTE — TELEPHONE ENCOUNTER
----- Message from Estefany Warner DO sent at 1/26/2024  9:21 AM EST -----  Call patient regarding his coronary calcium score  It is excellent at 6  I would like him to keep working on getting his cholesterol down through diet but do not find any to treat it with medication at this time        Left message to call back.

## 2024-01-30 NOTE — TELEPHONE ENCOUNTER
Returned pt's phone call. [facility] for pt regarding his results. If he has any questions or concerns to call the office.

## 2024-09-30 ENCOUNTER — OFFICE VISIT (OUTPATIENT)
Dept: FAMILY MEDICINE CLINIC | Facility: CLINIC | Age: 49
End: 2024-09-30
Payer: COMMERCIAL

## 2024-09-30 VITALS
HEIGHT: 69 IN | WEIGHT: 207 LBS | HEART RATE: 76 BPM | TEMPERATURE: 97.8 F | SYSTOLIC BLOOD PRESSURE: 134 MMHG | DIASTOLIC BLOOD PRESSURE: 88 MMHG | BODY MASS INDEX: 30.66 KG/M2 | OXYGEN SATURATION: 97 % | RESPIRATION RATE: 14 BRPM

## 2024-09-30 DIAGNOSIS — H53.9 CHANGES IN VISION: Primary | ICD-10-CM

## 2024-09-30 PROCEDURE — 99213 OFFICE O/P EST LOW 20 MIN: CPT | Performed by: NURSE PRACTITIONER

## 2024-09-30 NOTE — PROGRESS NOTES
"Ambulatory Visit  Name: Da Gómez      : 1975      MRN: [id number]  Encounter Provider: JIM Charles  Encounter Date: 2024   Encounter department: [facility]    Assessment & Plan  Changes in vision       Pt encouraged to schedule an appointment w/ [facility] to further evaluate since this has been ongoing and he hasn't had a clear answer to his problem. If they deem it appropriate to get an MRI of brain with orbits, they will order this for him. Patient is encouraged to call our office for any questions/concerns, persistent or worsening symptoms. Patient states they understand and agree with treatment plan.         Pt to f/u PRN.    Depression Screening and Follow-up Plan: Patient was screened for depression during today's encounter. They screened negative with a PHQ-2 score of 0.      History of Present Illness     Pt presents today for concerns over a blurry spot to his right eye field of vision.  He admits this has been ongoing for some time. Not better or worse.  He was evaluated in our office in 2024 and Dr. Warner his PCP recommended he f/u w/ Ophthlamologist.  Pt notes he has seen [facility] as well as [facility], however has not been given any answers.  PT admits he was started on eye drops for dry eye but admits this has not helped.  Pt does admit an episode last week where he rubbed his eye and lost vision (vision went black) for 30 seconds to 1 minute.   He also feels like his eye itself is swollen.  Pt does have appointment w/ Dr. Mercado in [place] at the end of October.  He does wear contacts and is at a computer during the day.         History obtained from : patient  Review of Systems  As noted per HPI.  Medical History Reviewed by provider this encounter:           Objective     /88   Pulse 76   Temp 97.8 °F (36.6 °C)   Resp 14   Ht 5' 9.25\" (1.759 m)   Wt 93.9 kg (207 lb)   SpO2 97% "   BMI 30.35 kg/m²     Physical Exam  Vitals reviewed.   Constitutional:       Appearance: Normal appearance.   Eyes:      General: Lids are normal.         Right eye: No discharge or hordeolum.         Left eye: No discharge or hordeolum.      Extraocular Movements:      Right eye: Normal extraocular motion.      Left eye: Normal extraocular motion.      Pupils: Pupils are equal, round, and reactive to light.   Neurological:      Mental Status: He is alert.

## 2025-02-28 ENCOUNTER — OFFICE VISIT (OUTPATIENT)
Dept: FAMILY MEDICINE CLINIC | Facility: CLINIC | Age: 50
End: 2025-02-28
Payer: COMMERCIAL

## 2025-02-28 VITALS
RESPIRATION RATE: 14 BRPM | WEIGHT: 208 LBS | BODY MASS INDEX: 30.81 KG/M2 | SYSTOLIC BLOOD PRESSURE: 130 MMHG | DIASTOLIC BLOOD PRESSURE: 86 MMHG | TEMPERATURE: 97.3 F | OXYGEN SATURATION: 96 % | HEART RATE: 88 BPM | HEIGHT: 69 IN

## 2025-02-28 DIAGNOSIS — R05.1 ACUTE COUGH: Primary | ICD-10-CM

## 2025-02-28 PROCEDURE — 99213 OFFICE O/P EST LOW 20 MIN: CPT | Performed by: NURSE PRACTITIONER

## 2025-02-28 NOTE — PROGRESS NOTES
"Name: Da Gómez      : 1975      MRN: [id number]  Encounter Provider: JIM Charles  Encounter Date: 2025   Encounter department: [facility]  :  Assessment & Plan  Acute cough       I suspect allergic in nature. Pt will switch Zyrtec to Allegra and add Flonase nasal spray. Pt will give this 1-2 weeks. If not better, refer to Allergy. Patient is encouraged to call our office for any questions/concerns, persistent or worsening symptoms. Patient states they understand and agree with treatment plan.         Pt to f/u PRN.         History of Present Illness   Pt presents today for a dry unproductive cough since .  He was seen at Urgent Care and was prescribed Augmentin.  He notes his stomach was bothered and he later started to itch.  He then stopped the Augmentin entirely 2-3 days into the course.  He went to Urgent Care and was prescribed Prednisone 40 mg x 5 days.  He felt slowly better.  Pt denies change in diet, laundry detergents, soaps or new pets.  Pt is on Zyrtec daily for seasonal allergies.      Review of Systems  As noted per HPI.  Objective   /86   Pulse 88   Temp (!) 97.3 °F (36.3 °C)   Resp 14   Ht 5' 9.25\" (1.759 m)   Wt 94.3 kg (208 lb)   SpO2 96%   BMI 30.49 kg/m²      Physical Exam  Vitals reviewed.   Constitutional:       Appearance: Normal appearance.   HENT:      Right Ear: Tympanic membrane, ear canal and external ear normal.      Left Ear: Tympanic membrane, ear canal and external ear normal.      Nose: No congestion or rhinorrhea.      Mouth/Throat:      Pharynx: No oropharyngeal exudate or posterior oropharyngeal erythema.   Cardiovascular:      Rate and Rhythm: Normal rate and regular rhythm.      Pulses: Normal pulses.      Heart sounds: Normal heart sounds.   Pulmonary:      Effort: Pulmonary effort is normal.      Breath sounds: Normal breath sounds.   Lymphadenopathy:      Head:      Right side of " head: No tonsillar adenopathy.      Left side of head: No tonsillar adenopathy.   Neurological:      Mental Status: He is alert.